# Patient Record
Sex: FEMALE | Race: BLACK OR AFRICAN AMERICAN
[De-identification: names, ages, dates, MRNs, and addresses within clinical notes are randomized per-mention and may not be internally consistent; named-entity substitution may affect disease eponyms.]

---

## 2018-12-30 ENCOUNTER — HOSPITAL ENCOUNTER (EMERGENCY)
Dept: HOSPITAL 26 - MED | Age: 53
Discharge: HOME | End: 2018-12-30
Payer: MEDICAID

## 2018-12-30 VITALS — WEIGHT: 216 LBS | HEIGHT: 63 IN | BODY MASS INDEX: 38.27 KG/M2

## 2018-12-30 VITALS — DIASTOLIC BLOOD PRESSURE: 81 MMHG | SYSTOLIC BLOOD PRESSURE: 124 MMHG

## 2018-12-30 VITALS — DIASTOLIC BLOOD PRESSURE: 80 MMHG | SYSTOLIC BLOOD PRESSURE: 121 MMHG

## 2018-12-30 DIAGNOSIS — M79.89: ICD-10-CM

## 2018-12-30 DIAGNOSIS — M79.644: Primary | ICD-10-CM

## 2018-12-30 DIAGNOSIS — Z88.6: ICD-10-CM

## 2018-12-30 NOTE — NUR
PT GIVEN A CYNTHIA TAPE SPLINT OF THE 2ND AND 3RD FINGERS OF THE RIGHT HAND. 
PMSCs INTACT, PT STATES THE TAPE IS NOT TOO TIGHT. PT GIVEN A SLING. APPLIED TO 
PT'S RIGHT SIDE AND SECURED IN PLACE. PT STATES SLING IS AT A COMFORTABLE 
HEIGHT.

## 2018-12-30 NOTE — NUR
54 Y/O F C/O PAIN SWELLING RIGHT 2ND DIGIT X 5 MONTHS 

S/P CAUGHT FINGER ON DOOR; <3 SEC CAP REFILL



---UNABLE TO USE HAND 2 TO PAIN SWELLING---ICE INCREASES PAIN

REQUESTING X-RAY



HX---DM

RX---GLIPIZIDE 5MG

## 2018-12-30 NOTE — NUR
DR. FLETCHER AT BEDSIDE TALKING TO PT. PT STATES, "GET MY PAPERWORK NOW." DR. FLETCHER STATED HE IS GOING TO SEE A PT AND WILL DO IT.

## 2018-12-30 NOTE — NUR
Patient discharged with v/s stable. Written and verbal after care instructions 
given and explained. 

Patient alert, oriented and verbalized understanding of instructions. 
Ambulatory with steady gait. All questions addressed prior to discharge. ID 
band removed. Patient advised to follow up with PMD. Rx of Fioricet 50mg given. 
Patient educated on indication of medication including possible reaction and 
side effects. Opportunity to ask questions provided and answered.

## 2018-12-30 NOTE — NUR
PT REFUSES TRAMADOL AT THIS TIME. PT STATES, "TRAMADOL WON'T DO SHIT FOR ME, I 
ALREADY KNOW. I DON'T WANT IT." 

ER MD MADE AWARE.

## 2019-06-03 ENCOUNTER — HOSPITAL ENCOUNTER (EMERGENCY)
Dept: HOSPITAL 26 - MED | Age: 54
Discharge: LEFT BEFORE DISCHARGE | End: 2019-06-03
Payer: MEDICAID

## 2019-06-03 VITALS — WEIGHT: 212.5 LBS | HEIGHT: 62 IN | BODY MASS INDEX: 39.11 KG/M2

## 2019-06-03 VITALS — SYSTOLIC BLOOD PRESSURE: 135 MMHG | DIASTOLIC BLOOD PRESSURE: 99 MMHG

## 2019-06-03 VITALS — DIASTOLIC BLOOD PRESSURE: 84 MMHG | SYSTOLIC BLOOD PRESSURE: 120 MMHG

## 2019-06-03 DIAGNOSIS — Z53.21: ICD-10-CM

## 2019-06-03 DIAGNOSIS — M25.571: Primary | ICD-10-CM

## 2019-06-03 DIAGNOSIS — E11.9: ICD-10-CM

## 2019-06-03 DIAGNOSIS — Z79.84: ICD-10-CM
